# Patient Record
Sex: FEMALE | Race: WHITE | NOT HISPANIC OR LATINO | Employment: OTHER | URBAN - METROPOLITAN AREA
[De-identification: names, ages, dates, MRNs, and addresses within clinical notes are randomized per-mention and may not be internally consistent; named-entity substitution may affect disease eponyms.]

---

## 2023-10-17 ENCOUNTER — TELEPHONE (OUTPATIENT)
Dept: PAIN MEDICINE | Facility: CLINIC | Age: 58
End: 2023-10-17
Payer: COMMERCIAL

## 2023-10-17 NOTE — TELEPHONE ENCOUNTER
LEFT MESSAGE FOR PATIENT TO GIVE THE OFFICE A CALL BACK TO SCHEDULE NEW PATIENT VISIT WITH DR SARMIENTO.

## 2023-11-07 PROBLEM — D23.9 OTHER BENIGN NEOPLASM OF SKIN, UNSPECIFIED: Status: ACTIVE | Noted: 2022-11-22

## 2023-11-07 PROBLEM — E04.2 MULTIPLE THYROID NODULES: Status: ACTIVE | Noted: 2023-11-07

## 2023-11-07 PROBLEM — H90.3 ASYMMETRIC SNHL (SENSORINEURAL HEARING LOSS): Status: ACTIVE | Noted: 2023-11-07

## 2023-11-07 PROBLEM — E04.1 LEFT THYROID NODULE: Status: ACTIVE | Noted: 2023-11-07

## 2023-11-07 PROBLEM — D48.5 NEOPLASM OF UNCERTAIN BEHAVIOR OF SKIN: Status: ACTIVE | Noted: 2022-11-22

## 2023-11-07 PROBLEM — L81.4 OTHER MELANIN HYPERPIGMENTATION: Status: ACTIVE | Noted: 2022-11-22

## 2023-11-07 PROBLEM — L57.0 ACTINIC KERATOSIS: Status: ACTIVE | Noted: 2022-11-22

## 2023-11-07 PROBLEM — D18.01 HEMANGIOMA OF SKIN AND SUBCUTANEOUS TISSUE: Status: ACTIVE | Noted: 2022-11-22

## 2023-11-07 PROBLEM — R09.A2 GLOBUS SENSATION: Status: ACTIVE | Noted: 2023-11-07

## 2023-11-07 PROBLEM — H81.12 BENIGN PAROXYSMAL POSITIONAL VERTIGO OF LEFT EAR: Status: ACTIVE | Noted: 2023-11-07

## 2023-11-07 PROBLEM — L70.0 ACNE VULGARIS: Status: ACTIVE | Noted: 2022-11-22

## 2023-11-07 PROBLEM — G60.9 IDIOPATHIC PERIPHERAL NEUROPATHY: Status: ACTIVE | Noted: 2023-11-07

## 2023-11-07 PROBLEM — D22.70 MELANOCYTIC NEVI OF UNSPECIFIED LOWER LIMB, INCLUDING HIP: Status: ACTIVE | Noted: 2022-11-22

## 2023-11-07 PROBLEM — L90.5 SCAR CONDITION AND FIBROSIS OF SKIN: Status: ACTIVE | Noted: 2022-11-22

## 2023-11-07 PROBLEM — Z85.820 HISTORY OF MELANOMA: Status: ACTIVE | Noted: 2022-11-22

## 2023-11-07 PROBLEM — D22.60 MELANOCYTIC NEVI OF UNSPECIFIED UPPER LIMB, INCLUDING SHOULDER: Status: ACTIVE | Noted: 2022-11-22

## 2023-11-07 PROBLEM — D22.4 MELANOCYTIC NEVI OF SCALP AND NECK: Status: ACTIVE | Noted: 2022-11-22

## 2023-11-07 PROBLEM — H91.90 HEARING LOSS: Status: ACTIVE | Noted: 2023-11-07

## 2023-11-07 PROBLEM — L71.9 ROSACEA, UNSPECIFIED: Status: ACTIVE | Noted: 2022-11-22

## 2023-11-07 PROBLEM — D22.30 MELANOCYTIC NEVI OF UNSPECIFIED PART OF FACE: Status: ACTIVE | Noted: 2022-11-22

## 2023-11-07 PROBLEM — I10 BENIGN ESSENTIAL HYPERTENSION: Status: ACTIVE | Noted: 2023-11-07

## 2023-11-07 PROBLEM — G47.33 OBSTRUCTIVE SLEEP APNEA SYNDROME: Status: ACTIVE | Noted: 2023-11-07

## 2023-11-07 PROBLEM — D22.5 MELANOCYTIC NEVI OF TRUNK: Status: ACTIVE | Noted: 2022-11-22

## 2023-11-07 PROBLEM — E04.9 NONTOXIC GOITER: Status: ACTIVE | Noted: 2023-11-07

## 2023-11-07 RX ORDER — TRETINOIN 0.8 MG/G
GEL TOPICAL
COMMUNITY
Start: 2015-10-09 | End: 2024-03-27 | Stop reason: WASHOUT

## 2023-11-07 RX ORDER — POTASSIUM CHLORIDE 1500 MG/1
20 TABLET, EXTENDED RELEASE ORAL
COMMUNITY
Start: 2023-08-02 | End: 2024-01-30

## 2023-11-07 RX ORDER — DOXYCYCLINE 40 MG/1
40 CAPSULE ORAL
COMMUNITY
Start: 2016-04-18 | End: 2024-03-27 | Stop reason: WASHOUT

## 2023-11-07 RX ORDER — TRETINOIN 0.5 MG/G
CREAM TOPICAL
COMMUNITY
Start: 2022-04-20

## 2023-11-07 RX ORDER — GABAPENTIN 100 MG/1
100 CAPSULE ORAL DAILY
COMMUNITY
Start: 2023-04-18 | End: 2024-03-27 | Stop reason: WASHOUT

## 2023-11-07 RX ORDER — MUPIROCIN 20 MG/G
OINTMENT TOPICAL
COMMUNITY
Start: 2023-10-13 | End: 2024-03-27 | Stop reason: WASHOUT

## 2023-11-07 RX ORDER — HYALURONATE SODIUM 0.2 %
CREAM (GRAM) TOPICAL
COMMUNITY
Start: 2015-03-13 | End: 2024-03-27 | Stop reason: WASHOUT

## 2023-11-07 RX ORDER — HYDROCHLOROTHIAZIDE 50 MG/1
50 TABLET ORAL DAILY
COMMUNITY
Start: 2018-01-08 | End: 2023-12-25

## 2023-11-07 RX ORDER — METRONIDAZOLE 7.5 MG/G
CREAM TOPICAL
COMMUNITY
Start: 2015-03-13 | End: 2024-03-27 | Stop reason: WASHOUT

## 2023-11-07 RX ORDER — TRETINOIN 0.25 MG/G
CREAM TOPICAL
COMMUNITY
Start: 2023-10-13 | End: 2024-03-27 | Stop reason: WASHOUT

## 2023-11-07 RX ORDER — IVERMECTIN 10 MG/G
CREAM TOPICAL
COMMUNITY
Start: 2016-04-18

## 2023-11-07 RX ORDER — MELOXICAM 15 MG/1
15 TABLET ORAL DAILY
COMMUNITY
Start: 2023-08-04 | End: 2024-03-27 | Stop reason: WASHOUT

## 2023-11-08 ENCOUNTER — OFFICE VISIT (OUTPATIENT)
Dept: PAIN MEDICINE | Facility: CLINIC | Age: 58
End: 2023-11-08
Payer: COMMERCIAL

## 2023-11-08 VITALS — SYSTOLIC BLOOD PRESSURE: 152 MMHG | RESPIRATION RATE: 22 BRPM | HEART RATE: 79 BPM | DIASTOLIC BLOOD PRESSURE: 88 MMHG

## 2023-11-08 DIAGNOSIS — G56.82 NEUROPATHY OF LEFT SUPRASCAPULAR NERVE: Primary | ICD-10-CM

## 2023-11-08 PROCEDURE — 99214 OFFICE O/P EST MOD 30 MIN: CPT | Performed by: ANESTHESIOLOGY

## 2023-11-08 PROCEDURE — 1036F TOBACCO NON-USER: CPT | Performed by: ANESTHESIOLOGY

## 2023-11-08 PROCEDURE — 99204 OFFICE O/P NEW MOD 45 MIN: CPT | Performed by: ANESTHESIOLOGY

## 2023-11-08 PROCEDURE — 3077F SYST BP >= 140 MM HG: CPT | Performed by: ANESTHESIOLOGY

## 2023-11-08 PROCEDURE — 3079F DIAST BP 80-89 MM HG: CPT | Performed by: ANESTHESIOLOGY

## 2023-11-08 RX ORDER — PREGABALIN 75 MG/1
75 CAPSULE ORAL 2 TIMES DAILY
Qty: 60 CAPSULE | Refills: 1 | Status: SHIPPED | OUTPATIENT
Start: 2023-11-08 | End: 2024-03-27 | Stop reason: WASHOUT

## 2023-11-08 ASSESSMENT — ENCOUNTER SYMPTOMS
ARTHRALGIAS: 1
ACTIVITY CHANGE: 1

## 2023-11-08 ASSESSMENT — PATIENT HEALTH QUESTIONNAIRE - PHQ9
SUM OF ALL RESPONSES TO PHQ9 QUESTIONS 1 & 2: 0
1. LITTLE INTEREST OR PLEASURE IN DOING THINGS: NOT AT ALL
2. FEELING DOWN, DEPRESSED OR HOPELESS: NOT AT ALL

## 2023-11-08 ASSESSMENT — LIFESTYLE VARIABLES
SKIP TO QUESTIONS 9-10: 1
HOW MANY STANDARD DRINKS CONTAINING ALCOHOL DO YOU HAVE ON A TYPICAL DAY: 1 OR 2
HOW OFTEN DO YOU HAVE SIX OR MORE DRINKS ON ONE OCCASION: NEVER
HOW OFTEN DO YOU HAVE A DRINK CONTAINING ALCOHOL: MONTHLY OR LESS
AUDIT-C TOTAL SCORE: 1
TOTAL SCORE: 0

## 2023-11-08 ASSESSMENT — PAIN DESCRIPTION - DESCRIPTORS: DESCRIPTORS: TIGHTNESS

## 2023-11-08 ASSESSMENT — PAIN - FUNCTIONAL ASSESSMENT: PAIN_FUNCTIONAL_ASSESSMENT: 0-10

## 2023-11-08 ASSESSMENT — PAIN SCALES - GENERAL: PAINLEVEL_OUTOF10: 8

## 2023-11-08 NOTE — PROGRESS NOTES
Patient has left shoulder and neck pain.  In 2018 she grabbed the collar of a dog, and it jerked her forward. 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

## 2023-11-08 NOTE — PROGRESS NOTES
The patient is a 57-year-old female with left shoulder pain.  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  5 years ago she developed an acute onset of shoulder pain that started after she was walking her dog when her dog pulled forward suddenly.  This shoulder pain is intermittent.  It happens on some days depending upon her level of activity.  It resolves by the time she wakes up the next morning.  The pain seems to start in the pectoral/clavicular area and wraps over her shoulder to the scapula.  When the pain is present, it is quite severe.  The pain is affecting her quality of life to a significant degree.  The patient has undergone a variety of treatments including subacromial shoulder injections, anterior shoulder injections as well as cervical epidural steroid injections.  None provided appreciable relief.  She tried a scant amount of gabapentin a few times without benefit.  She has participated in physical therapy.  She has modified her lifestyle because of the pain.    Review of Systems   Constitutional:  Positive for activity change.   Musculoskeletal:  Positive for arthralgias.   All other systems reviewed and are negative.    GENERAL: alert and appropriate, in no distress, well-hydrated, well nourished, interactive         SKIN: no rash noted         HEAD: normocephalic, no abnormality or lesion noted         EYES: no injection and visual acuity is grossly normal         EARS: external ears normal, no mastoid tenderness         NOSE: external nose normal without rhinorrhea         OROPHARYNX: moist mucus membranes, no tonsillar hypertrophy/exudate, uvula midline and pharynx non-erythematous, lips, teeth and gums are without obvious lesion         NECK: Adequate ROM, no cervical LNs noted         RESPIRATORY: breathing non-labored and no grunting/flaring/retractions         CHEST: equal chest rise with normal respiratory effort         ABDOMEN: soft  and non-tender         BACK: back normal in appearance, cervical and lumbar spine with adequate ROM         EXTREMITIES: strength intact, left shoulder range of motion is reduced and reproduced her neuropathic pain         NEUROLOGIC: gait antalgic, SLR positive, Nat sign negative, Spurling sign did not reproduce pain, sensation grossly intact    Assessment and Plan    -Chronicity--chronic neuropathic pain    -Diagnostics--no new imaging ordered    -Pharmacologic--the patient may benefit from an adequate dose of pregabalin taken regularly.  We discussed the mechanism of action of this medication as well as the side effect profile.    -Psychologic--no need for psychologic intervention from my standpoint    -Physical--we discussed the importance of physical therapy and exercise.  We discussed avoidance and modification techniques.    -Intervention--if the pregabalin is ineffective, we will consider temporary peripheral nerve stimulation of the suprascapular nerve with a Sprint device.    I spent time educating the patient on the condition including the treatment and the prognosis.  I invited the patient to call at anytime with any questions.

## 2023-11-29 ENCOUNTER — APPOINTMENT (OUTPATIENT)
Dept: RADIOLOGY | Facility: CLINIC | Age: 58
End: 2023-11-29
Payer: COMMERCIAL

## 2023-11-29 ENCOUNTER — ANCILLARY PROCEDURE (OUTPATIENT)
Dept: RADIOLOGY | Facility: CLINIC | Age: 58
End: 2023-11-29
Payer: COMMERCIAL

## 2023-11-29 VITALS — WEIGHT: 225 LBS | HEIGHT: 70 IN | BODY MASS INDEX: 32.21 KG/M2

## 2023-11-29 DIAGNOSIS — Z12.31 SCREENING MAMMOGRAM, ENCOUNTER FOR: ICD-10-CM

## 2023-11-29 PROCEDURE — 77063 BREAST TOMOSYNTHESIS BI: CPT

## 2024-01-10 ENCOUNTER — APPOINTMENT (OUTPATIENT)
Dept: PAIN MEDICINE | Facility: CLINIC | Age: 59
End: 2024-01-10
Payer: COMMERCIAL

## 2024-01-22 ENCOUNTER — APPOINTMENT (OUTPATIENT)
Dept: PAIN MEDICINE | Facility: CLINIC | Age: 59
End: 2024-01-22
Payer: COMMERCIAL

## 2024-01-29 ENCOUNTER — APPOINTMENT (OUTPATIENT)
Dept: PAIN MEDICINE | Facility: CLINIC | Age: 59
End: 2024-01-29
Payer: COMMERCIAL

## 2024-01-29 DIAGNOSIS — I10 BENIGN ESSENTIAL HYPERTENSION: Primary | ICD-10-CM

## 2024-01-30 RX ORDER — POTASSIUM CHLORIDE 1500 MG/1
20 TABLET, EXTENDED RELEASE ORAL DAILY
Qty: 30 TABLET | Refills: 0 | Status: SHIPPED | OUTPATIENT
Start: 2024-01-30 | End: 2024-03-27 | Stop reason: SDUPTHER

## 2024-01-31 ENCOUNTER — APPOINTMENT (OUTPATIENT)
Dept: PRIMARY CARE | Facility: CLINIC | Age: 59
End: 2024-01-31
Payer: COMMERCIAL

## 2024-03-27 ENCOUNTER — OFFICE VISIT (OUTPATIENT)
Dept: PRIMARY CARE | Facility: CLINIC | Age: 59
End: 2024-03-27
Payer: COMMERCIAL

## 2024-03-27 VITALS
WEIGHT: 222.4 LBS | HEART RATE: 70 BPM | OXYGEN SATURATION: 96 % | DIASTOLIC BLOOD PRESSURE: 66 MMHG | SYSTOLIC BLOOD PRESSURE: 118 MMHG | HEIGHT: 70 IN | BODY MASS INDEX: 31.84 KG/M2 | RESPIRATION RATE: 16 BRPM

## 2024-03-27 DIAGNOSIS — E66.1 CLASS 1 DRUG-INDUCED OBESITY WITH SERIOUS COMORBIDITY AND BODY MASS INDEX (BMI) OF 31.0 TO 31.9 IN ADULT: ICD-10-CM

## 2024-03-27 DIAGNOSIS — I10 BENIGN ESSENTIAL HYPERTENSION: ICD-10-CM

## 2024-03-27 DIAGNOSIS — Z00.00 ANNUAL PHYSICAL EXAM: Primary | ICD-10-CM

## 2024-03-27 PROCEDURE — 99213 OFFICE O/P EST LOW 20 MIN: CPT | Performed by: STUDENT IN AN ORGANIZED HEALTH CARE EDUCATION/TRAINING PROGRAM

## 2024-03-27 PROCEDURE — 3078F DIAST BP <80 MM HG: CPT | Performed by: STUDENT IN AN ORGANIZED HEALTH CARE EDUCATION/TRAINING PROGRAM

## 2024-03-27 PROCEDURE — 99396 PREV VISIT EST AGE 40-64: CPT | Performed by: STUDENT IN AN ORGANIZED HEALTH CARE EDUCATION/TRAINING PROGRAM

## 2024-03-27 PROCEDURE — 3074F SYST BP LT 130 MM HG: CPT | Performed by: STUDENT IN AN ORGANIZED HEALTH CARE EDUCATION/TRAINING PROGRAM

## 2024-03-27 PROCEDURE — 3008F BODY MASS INDEX DOCD: CPT | Performed by: STUDENT IN AN ORGANIZED HEALTH CARE EDUCATION/TRAINING PROGRAM

## 2024-03-27 RX ORDER — OMEGA-3-ACID ETHYL ESTERS 1 G/1
2 CAPSULE, LIQUID FILLED ORAL DAILY
COMMUNITY

## 2024-03-27 RX ORDER — POTASSIUM CHLORIDE 20 MEQ/1
20 TABLET, EXTENDED RELEASE ORAL DAILY
Qty: 90 TABLET | Refills: 1 | Status: SHIPPED | OUTPATIENT
Start: 2024-03-27

## 2024-03-27 RX ORDER — HYDROCHLOROTHIAZIDE 50 MG/1
50 TABLET ORAL DAILY
Qty: 90 TABLET | Refills: 1 | Status: SHIPPED | OUTPATIENT
Start: 2024-03-27

## 2024-03-27 ASSESSMENT — PATIENT HEALTH QUESTIONNAIRE - PHQ9
2. FEELING DOWN, DEPRESSED OR HOPELESS: NOT AT ALL
1. LITTLE INTEREST OR PLEASURE IN DOING THINGS: NOT AT ALL
SUM OF ALL RESPONSES TO PHQ9 QUESTIONS 1 AND 2: 0

## 2024-03-27 ASSESSMENT — ENCOUNTER SYMPTOMS
DIARRHEA: 0
SINUS PAIN: 0
SINUS PRESSURE: 0
DYSURIA: 0
PALPITATIONS: 0
ARTHRALGIAS: 0
ABDOMINAL PAIN: 0
CONSTIPATION: 0
ADENOPATHY: 0
RHINORRHEA: 0

## 2024-03-27 NOTE — PROGRESS NOTES
History Of Present Illness  Nette Lopez is a 58 y.o. female presents for cpe.     Hair loss along her part   Left shoulder   6 years   Dr. Bhat s/p steroid injections  MRI   Dr. Walt Capellan with no improvement. She has tried flexeril and lyrica per pain management with Dr. Marin         Past Medical History  She has a past medical history of Deficiency of vitamin K (11/16/2018), Encounter for screening for malignant neoplasm of colon (09/06/2018), Enteroviral vesicular stomatitis with exanthem (09/18/2019), Other specified health status (03/11/2016), Pain in left shoulder (01/16/2019), Pain in right finger(s) (03/11/2016), Pain in right shoulder, Pain in unspecified finger(s) (03/14/2016), Personal history of malignant melanoma of skin, Personal history of malignant melanoma of skin, Personal history of other diseases of the circulatory system (01/08/2018), Personal history of other diseases of the musculoskeletal system and connective tissue (12/03/2018), Personal history of other diseases of the musculoskeletal system and connective tissue (09/06/2018), Personal history of other diseases of the nervous system and sense organs, Personal history of other specified conditions (10/04/2019), Personal history of other specified conditions (10/16/2019), and Unspecified disorder of synovium and tendon, unspecified shoulder (03/11/2016).    Surgical History  She has a past surgical history that includes Other surgical history (07/12/2013).     Social History  She reports that she has never smoked. She has never used smokeless tobacco. She reports current alcohol use. No history on file for drug use.    Family History  Family History   Problem Relation Name Age of Onset    Arthritis Mother      Hypertension Mother      Hypothyroidism Mother      Heart disease Father      Arthritis Father      No Known Problems Brother      No Known Problems Daughter      No Known Problems Son      Colon cancer Maternal  Grandmother      Cancer Maternal Grandmother      Hypertension Maternal Grandmother      Hypertension Maternal Grandfather      Cancer Paternal Grandmother      Melanoma Paternal Grandmother          Allergies  Diclofenac-silicone, adhesive    Review of Systems   HENT:  Negative for rhinorrhea, sinus pressure and sinus pain.    Cardiovascular:  Negative for chest pain and palpitations.   Gastrointestinal:  Negative for abdominal pain, constipation and diarrhea.   Genitourinary:  Negative for dysuria.   Musculoskeletal:  Negative for arthralgias and gait problem.   Hematological:  Negative for adenopathy.        Physical Exam  Vitals reviewed.   Constitutional:       General: She is not in acute distress.     Appearance: She is not ill-appearing.   HENT:      Right Ear: Tympanic membrane and ear canal normal.      Left Ear: Tympanic membrane and ear canal normal.      Mouth/Throat:      Mouth: Mucous membranes are moist.      Pharynx: Oropharynx is clear. No oropharyngeal exudate or posterior oropharyngeal erythema.   Eyes:      Extraocular Movements: Extraocular movements intact.      Conjunctiva/sclera: Conjunctivae normal.      Pupils: Pupils are equal, round, and reactive to light.   Neck:      Vascular: No carotid bruit.   Cardiovascular:      Rate and Rhythm: Normal rate and regular rhythm.      Heart sounds: No murmur heard.     No gallop.   Pulmonary:      Effort: Pulmonary effort is normal.      Breath sounds: Normal breath sounds. No wheezing, rhonchi or rales.   Abdominal:      General: Abdomen is flat. Bowel sounds are normal.      Palpations: Abdomen is soft.      Tenderness: There is no abdominal tenderness.   Musculoskeletal:      Cervical back: Neck supple.      Left lower leg: No edema.   Skin:     General: Skin is warm and dry.      Findings: No rash.   Neurological:      General: No focal deficit present.      Mental Status: She is alert and oriented to person, place, and time.      Gait: Gait  normal.   Psychiatric:         Mood and Affect: Mood normal.         Behavior: Behavior normal.        Last Recorded Vitals  /66   Pulse 70   Resp 16   Wt 101 kg (222 lb 6.4 oz)   SpO2 96%     Relevant Results    Current Outpatient Medications:     hydroCHLOROthiazide (HYDRODiuril) 50 mg tablet, TAKE 1 TABLET DAILY AS DIRECTED, Disp: 90 tablet, Rfl: 1    potassium chloride CR (Klor-Con M20) 20 mEq ER tablet, TAKE 1 TABLET DAILY, Disp: 30 tablet, Rfl: 0    Soolantra 1 % cream, 1 Application, Disp: , Rfl:     tretinoin (Retin-A) 0.05 % cream, 1 Application, Disp: , Rfl:        Assessment/Plan   Health Maintenance  - CBC, CMP, Lipid  - up to date with immunizations  - Pap UTD 2020 due 2025  - Mammogram utd  - screening colonoscopy up to date 2020 with Dr. Cain   - eat a diet high in lean protein, vegetable, fruits, and low in carbohydrates  - exercise 20-30 minutes 4-5 days a week     HTN  - in office /78  - continue HCTZ  - continue K+  - follow up in 6 months     Health Maintenance   Topic Date Due    Yearly Adult Physical  Never done    Hepatitis B Vaccines (1 of 3 - 3-dose series) Never done    HIV Screening  Never done    Derm Melanoma Skin Check  Never done    MMR Vaccines (1 of 1 - Standard series) Never done    Hepatitis C Screening  Never done    Cervical Cancer Screening  08/07/2023    Influenza Vaccine (1) 09/01/2023    COVID-19 Vaccine (4 - 2023-24 season) 09/01/2023    DTaP/Tdap/Td Vaccines (2 - Td or Tdap) 11/22/2023    Mammogram  11/29/2024    Lipid Panel  08/12/2026    Colorectal Cancer Screening  10/19/2030    Zoster Vaccines  Completed    HIB Vaccines  Aged Out    IPV Vaccines  Aged Out    Hepatitis A Vaccines  Aged Out    Meningococcal Vaccine  Aged Out    Rotavirus Vaccines  Aged Out    HPV Vaccines  Aged Out    Pneumococcal Vaccine: Pediatrics (0 to 5 Years) and At-Risk Patients (6 to 64 Years)  Aged Out    Irritable Bowel Syndrome  Discontinued           Adeola Cordoba,

## 2024-07-24 ENCOUNTER — LAB (OUTPATIENT)
Dept: LAB | Facility: LAB | Age: 59
End: 2024-07-24
Payer: COMMERCIAL

## 2024-07-24 DIAGNOSIS — E66.1 CLASS 1 DRUG-INDUCED OBESITY WITH SERIOUS COMORBIDITY AND BODY MASS INDEX (BMI) OF 31.0 TO 31.9 IN ADULT: ICD-10-CM

## 2024-07-24 DIAGNOSIS — I10 BENIGN ESSENTIAL HYPERTENSION: ICD-10-CM

## 2024-07-24 LAB
ALBUMIN SERPL BCP-MCNC: 4.7 G/DL (ref 3.4–5)
ALP SERPL-CCNC: 55 U/L (ref 33–110)
ALT SERPL W P-5'-P-CCNC: 13 U/L (ref 7–45)
ANION GAP SERPL CALC-SCNC: 11 MMOL/L (ref 10–20)
AST SERPL W P-5'-P-CCNC: 12 U/L (ref 9–39)
BASOPHILS # BLD AUTO: 0.02 X10*3/UL (ref 0–0.1)
BASOPHILS NFR BLD AUTO: 0.3 %
BILIRUB SERPL-MCNC: 0.9 MG/DL (ref 0–1.2)
BUN SERPL-MCNC: 18 MG/DL (ref 6–23)
CALCIUM SERPL-MCNC: 10 MG/DL (ref 8.6–10.3)
CHLORIDE SERPL-SCNC: 100 MMOL/L (ref 98–107)
CHOLEST SERPL-MCNC: 203 MG/DL (ref 0–199)
CHOLESTEROL/HDL RATIO: 4
CO2 SERPL-SCNC: 33 MMOL/L (ref 21–32)
CREAT SERPL-MCNC: 0.8 MG/DL (ref 0.5–1.05)
CREAT UR-MCNC: 137.5 MG/DL (ref 20–320)
EGFRCR SERPLBLD CKD-EPI 2021: 87 ML/MIN/1.73M*2
EOSINOPHIL # BLD AUTO: 0.12 X10*3/UL (ref 0–0.7)
EOSINOPHIL NFR BLD AUTO: 1.8 %
ERYTHROCYTE [DISTWIDTH] IN BLOOD BY AUTOMATED COUNT: 12.5 % (ref 11.5–14.5)
EST. AVERAGE GLUCOSE BLD GHB EST-MCNC: 111 MG/DL
GLUCOSE SERPL-MCNC: 94 MG/DL (ref 74–99)
HBA1C MFR BLD: 5.5 %
HCT VFR BLD AUTO: 42.1 % (ref 36–46)
HDLC SERPL-MCNC: 50.7 MG/DL
HGB BLD-MCNC: 13.8 G/DL (ref 12–16)
IMM GRANULOCYTES # BLD AUTO: 0.01 X10*3/UL (ref 0–0.7)
IMM GRANULOCYTES NFR BLD AUTO: 0.2 % (ref 0–0.9)
LDLC SERPL CALC-MCNC: 123 MG/DL
LYMPHOCYTES # BLD AUTO: 2.18 X10*3/UL (ref 1.2–4.8)
LYMPHOCYTES NFR BLD AUTO: 32.8 %
MCH RBC QN AUTO: 29.5 PG (ref 26–34)
MCHC RBC AUTO-ENTMCNC: 32.8 G/DL (ref 32–36)
MCV RBC AUTO: 90 FL (ref 80–100)
MICROALBUMIN UR-MCNC: 7.1 MG/L
MICROALBUMIN/CREAT UR: 5.2 UG/MG CREAT
MONOCYTES # BLD AUTO: 0.51 X10*3/UL (ref 0.1–1)
MONOCYTES NFR BLD AUTO: 7.7 %
NEUTROPHILS # BLD AUTO: 3.8 X10*3/UL (ref 1.2–7.7)
NEUTROPHILS NFR BLD AUTO: 57.2 %
NON HDL CHOLESTEROL: 152 MG/DL (ref 0–149)
NRBC BLD-RTO: NORMAL /100{WBCS}
PLATELET # BLD AUTO: 261 X10*3/UL (ref 150–450)
POTASSIUM SERPL-SCNC: 3.5 MMOL/L (ref 3.5–5.3)
PROT SERPL-MCNC: 7.4 G/DL (ref 6.4–8.2)
RBC # BLD AUTO: 4.68 X10*6/UL (ref 4–5.2)
SODIUM SERPL-SCNC: 140 MMOL/L (ref 136–145)
TRIGL SERPL-MCNC: 147 MG/DL (ref 0–149)
TSH SERPL-ACNC: 2.6 MIU/L (ref 0.44–3.98)
VLDL: 29 MG/DL (ref 0–40)
WBC # BLD AUTO: 6.6 X10*3/UL (ref 4.4–11.3)

## 2024-07-24 PROCEDURE — 82570 ASSAY OF URINE CREATININE: CPT

## 2024-07-24 PROCEDURE — 82043 UR ALBUMIN QUANTITATIVE: CPT

## 2024-07-24 PROCEDURE — 83036 HEMOGLOBIN GLYCOSYLATED A1C: CPT

## 2024-07-24 PROCEDURE — 36415 COLL VENOUS BLD VENIPUNCTURE: CPT

## 2024-11-11 ENCOUNTER — TELEPHONE (OUTPATIENT)
Dept: OTOLARYNGOLOGY | Facility: CLINIC | Age: 59
End: 2024-11-11
Payer: COMMERCIAL

## 2024-11-11 DIAGNOSIS — E04.1 THYROID NODULE: Primary | ICD-10-CM

## 2024-11-26 DIAGNOSIS — I10 BENIGN ESSENTIAL HYPERTENSION: ICD-10-CM

## 2024-11-26 RX ORDER — HYDROCHLOROTHIAZIDE 50 MG/1
50 TABLET ORAL DAILY
Qty: 90 TABLET | Refills: 3 | Status: SHIPPED | OUTPATIENT
Start: 2024-11-26

## 2024-12-05 ENCOUNTER — TELEPHONE (OUTPATIENT)
Dept: PRIMARY CARE | Facility: CLINIC | Age: 59
End: 2024-12-05
Payer: COMMERCIAL

## 2024-12-05 DIAGNOSIS — Z12.31 SCREENING MAMMOGRAM FOR BREAST CANCER: Primary | ICD-10-CM

## 2025-01-07 ENCOUNTER — HOSPITAL ENCOUNTER (OUTPATIENT)
Dept: RADIOLOGY | Facility: HOSPITAL | Age: 60
Discharge: HOME | End: 2025-01-07
Payer: COMMERCIAL

## 2025-01-07 VITALS — BODY MASS INDEX: 31.78 KG/M2 | WEIGHT: 222 LBS | HEIGHT: 70 IN

## 2025-01-07 DIAGNOSIS — Z12.31 SCREENING MAMMOGRAM FOR BREAST CANCER: ICD-10-CM

## 2025-01-07 PROCEDURE — 77063 BREAST TOMOSYNTHESIS BI: CPT | Performed by: RADIOLOGY

## 2025-01-07 PROCEDURE — 77067 SCR MAMMO BI INCL CAD: CPT | Performed by: RADIOLOGY

## 2025-01-07 PROCEDURE — 77067 SCR MAMMO BI INCL CAD: CPT

## 2025-02-04 ENCOUNTER — HOSPITAL ENCOUNTER (OUTPATIENT)
Dept: RADIOLOGY | Facility: HOSPITAL | Age: 60
Discharge: HOME | End: 2025-02-04
Payer: COMMERCIAL

## 2025-02-04 DIAGNOSIS — E04.1 THYROID NODULE: ICD-10-CM

## 2025-02-04 PROCEDURE — 76536 US EXAM OF HEAD AND NECK: CPT

## 2025-02-04 PROCEDURE — 76536 US EXAM OF HEAD AND NECK: CPT | Performed by: RADIOLOGY

## 2025-02-13 ENCOUNTER — APPOINTMENT (OUTPATIENT)
Dept: OTOLARYNGOLOGY | Facility: CLINIC | Age: 60
End: 2025-02-13
Payer: COMMERCIAL

## 2025-02-13 VITALS — WEIGHT: 228 LBS | BODY MASS INDEX: 32.64 KG/M2 | HEIGHT: 70 IN | TEMPERATURE: 97.3 F

## 2025-02-13 DIAGNOSIS — E04.1 LEFT THYROID NODULE: Primary | ICD-10-CM

## 2025-02-13 DIAGNOSIS — H90.3 BILATERAL SENSORINEURAL HEARING LOSS: ICD-10-CM

## 2025-02-13 PROCEDURE — 99214 OFFICE O/P EST MOD 30 MIN: CPT | Performed by: OTOLARYNGOLOGY

## 2025-02-13 PROCEDURE — 3008F BODY MASS INDEX DOCD: CPT | Performed by: OTOLARYNGOLOGY

## 2025-02-13 PROCEDURE — 1036F TOBACCO NON-USER: CPT | Performed by: OTOLARYNGOLOGY

## 2025-02-13 NOTE — PROGRESS NOTES
Chief Complaint   Patient presents with    Follow-up     LOV 9/22 F/U US  THYROID 2/4/25 IN CHART     HPI:  Nette Lopez is a 59 y.o. female who is today for continued evaluation of her thyroid.  She has a history of right thyroid lobectomy for benign disease in 2009.  She has a history of several nodules on the left-hand side which have been followed. Needle biopsy with uniform follicular cells in 2009.   Recent ultrasound from February 4, 2025 shows no significant change.  One of the nodules did get bigger by a few millimeters but still remains spongiform in nature and low risk.  H/O bilateral sloping high-frequency sensorineural hearing loss which is slightly worse on the left-hand side. 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  ==  US THYROID;  2/4/2025         COMPARISON:  08/12/2022, 03/14/2016      FINDINGS:  RIGHT LOBE:  Status post right thyroidectomy.  There is no mass in the right thyroid bed.      LEFT LOBE:  The left lobe is normal in size measuring 4.3 x 1.9 x 1.9 cm.  There is heterogeneity of the echotexture of the left lobe.  There is a 0.7 cm benign colloid cyst in the left lobe.    NODULE #1:      Location: Superior  Size: 1.3 x 1.1 x 0.6 cm  Composition: Spongiform 0 point  Echogenicity: Hypoechoic 2 points  Shape:  Wider than tall 0 points  Margin:  Smooth 0 points  Echogenic Foci: None 0 points  On 08/12/2022, this measured 1.2 x 1.1 x 0.7 cm  On 03/14/2016, this measured 0.9 x 0.8 x 0.5 cm      If present previously:  Significant change in size (>/= 20% diameter increase in at least  two dimensions and minimal increase of 2mm?): No significant change.  Change in features or ACR TI-RADS category: No change.      The total score of this nodule is 2 points, corresponding to a  TI-RADS category 2-benign     NODULE #2:      Location: Mid  Size: 0.9 x 0.8 x 0.6 cm  Composition: Spongiform 0 point  Echogenicity: Hypoechoic 2 points  Shape:   Wider than tall 0 point  Margin:  Smooth 0 points  Echogenic Foci: None 0 points  On 08/12/2021, this measured 1.0 x 0.6 x 0.7 cm  On 03/14/2016, this was more cystic measuring 1.0 x 0.8 x 0.5 cm      If present previously:  Significant change in size (>/= 20% diameter increase in at least  two dimensions and minimal increase of 2mm?): No significant change.  Change in features or ACR TI-RADS category: No change.      The total score of this nodule is 2 points, corresponding to a  TI-RADS category 2-benign      NODULE #3:      Location: Inferior  Size: 2.0 x 1.7 x 1.2 cm  Composition: Spongiform 0 point  Echogenicity: Hypoechoic 2 point  Shape:  Wider than tall 0 points  Margin:  Smooth 0 points  Echogenic Foci: None 0 points  On 08/12/2022, this measured 1.7 x 1.4 x 1.0 cm.  On 03/14/2016, this measured 1.5 x 1.3 x 0.9 cm.      If present previously:  Significant change in size (>/= 20% diameter increase in at least  two dimensions and minimal increase of 2mm?): Yes, increased. Change  in features or ACR TI-RADS category: No change.      The total score of this nodule is 2 points, corresponding to a  TI-RADS category 2-benign      ISTHMUS:  The isthmus is surgically absent.      CERVICAL LYMPH NODES:  There are no enlarged or morphologically abnormal cervical lymph  nodes.        IMPRESSION:  1. Status post right thyroidectomy.  2. Nonenlarged left lobe with heterogeneity of the echotexture and  multiple nodules.  3. Benign thyroid nodules.  .  PMH:  Past Medical History:   Diagnosis Date    Deficiency of vitamin K 11/16/2018    Vitamin K deficiency    Encounter for screening for malignant neoplasm of colon 09/06/2018    Screening for colon cancer    Enteroviral vesicular stomatitis with exanthem 09/18/2019    Hand, foot and mouth disease    Other specified health status 03/11/2016    Contraception    Pain in left shoulder 01/16/2019    Left anterior shoulder pain    Pain in right finger(s) 03/11/2016    Pain of  right thumb    Pain in right shoulder     Pain of right shoulder region    Pain in unspecified finger(s) 03/14/2016    Thumb pain    Personal history of malignant melanoma of skin     History of malignant melanoma    Personal history of malignant melanoma of skin     History of malignant melanoma of skin    Personal history of other diseases of the circulatory system 01/08/2018    History of hypertension    Personal history of other diseases of the musculoskeletal system and connective tissue 12/03/2018    History of muscle spasm    Personal history of other diseases of the musculoskeletal system and connective tissue 09/06/2018    History of neck pain    Personal history of other diseases of the nervous system and sense organs     History of sleep apnea    Personal history of other specified conditions 10/04/2019    History of wheezing    Personal history of other specified conditions 10/16/2019    History of persistent cough    Unspecified disorder of synovium and tendon, unspecified shoulder 03/11/2016    Rotator cuff dysfunction     Past Surgical History:   Procedure Laterality Date    OTHER SURGICAL HISTORY  07/12/2013    Thyroid Surgery Thyroid Lobectomy Right Lobe         Medications:     Current Outpatient Medications:     hydroCHLOROthiazide (HYDRODiuril) 50 mg tablet, TAKE 1 TABLET DAILY AS DIRECTED, Disp: 90 tablet, Rfl: 3    omega-3 acid ethyl esters (Lovaza) 1 gram capsule, Take 2 capsules (2 g) by mouth once daily., Disp: , Rfl:     potassium chloride CR (Klor-Con M20) 20 mEq ER tablet, Take 1 tablet (20 mEq) by mouth once daily., Disp: 90 tablet, Rfl: 1    Soolantra 1 % cream, 1 Application (Patient not taking: Reported on 2/13/2025), Disp: , Rfl:      Allergies:  Allergies   Allergen Reactions    Diclofenac-Silicone, Adhesive Dermatitis and Rash        ROS:  Review of systems normal unless stated otherwise in the HPI and/or PMH.    Physical Exam:  Temperature 36.3 °C (97.3 °F), height 1.778 m (5'  "10\"), weight 103 kg (228 lb). Body mass index is 32.71 kg/m².     GENERAL APPEARANCE: Well developed and well nourished.  Alert and oriented in no acute distress.  Normal vocal quality.      HEAD/FACE: No erythema or edema or facial tenderness.  Normal facial nerve function bilaterally.    EAR:       EXTERNAL: Normal pinnas and external auditory canals without lesion or obstructing wax.       MIDDLE EAR: Tympanic membranes intact and mobile with normal landmarks.  Middle ear space appears well aerated.       TUBE STATUS: N/A       MASTOID CAVITY: N/A       HEARING: Gross hearing assessment is within normal limits.      NOSE:       VISUALIZED USING: Anterior rhinoscopy with headlight and nasal speculum.       DORSUM: Midline, nontraumatic appearance.       MUCOSA: Normal-appearing.       SECRETIONS: Normal.       SEPTUM: Midline and nonobstructing.       INFERIOR TURBINATES: Normal.       MIDDLE TURBINATES/MEATUS: N/A       BLEEDING: N/A         ORAL CAVITY/PHARYNX:       TEETH: Adequate dentition.       TONGUE: No mass or lesion.  Normal mobility.       FLOOR OF MOUTH: No mass or lesion.       PALATE: Normal hard palate, soft palate, and uvula.       OROPHARYNX: Normal without mass or lesion.       BUCCAL MUCOSA/GBS: Normal without mass or lesion.       LIPS: Normal.    LARYNX/HYPOPHARYNX/NASOPHARYNX: N/A    NECK: No palpable masses or abnormal adenopathy.  Trachea is midline.    THYROID: No thyromegaly or palpable nodule.  Mild subtle fullness on the left.  Previous incision well-healed.    SALIVARY GLANDS: Normal bilateral parotid and submandibular glands by inspection and palpation.    TMJ's: Normal.    NEURO: Cranial nerve exam grossly normal bilaterally.             Assessment/Plan   Nette was seen today for follow-up.  Diagnoses and all orders for this visit:  Left thyroid nodule (Primary)  Bilateral sensorineural hearing loss     Relatively stable left-sided thyroid nodules which are spongiform and low " risk in nature.  Benign biopsy over 15 years ago.  Recheck ultrasound and exam in 2 years.  Unfortunately her mother recently passed away but she did have relatively new hearing aids.  She is looking up possibly seeing if they could be fitted for her.  Will have her follow-up with an audiogram and hearing aid evaluation at the same time.  Follow up in about 2 years (around 2/13/2027).     Wilmer Samson MD

## 2025-03-24 DIAGNOSIS — I10 BENIGN ESSENTIAL HYPERTENSION: ICD-10-CM

## 2025-03-24 RX ORDER — POTASSIUM CHLORIDE 1500 MG/1
20 TABLET, EXTENDED RELEASE ORAL DAILY
Qty: 90 TABLET | Refills: 3 | OUTPATIENT
Start: 2025-03-24

## 2025-03-31 ENCOUNTER — TELEPHONE (OUTPATIENT)
Dept: PRIMARY CARE | Facility: CLINIC | Age: 60
End: 2025-03-31
Payer: COMMERCIAL

## 2025-04-01 DIAGNOSIS — I10 BENIGN ESSENTIAL HYPERTENSION: ICD-10-CM

## 2025-04-01 RX ORDER — POTASSIUM CHLORIDE 20 MEQ/1
20 TABLET, EXTENDED RELEASE ORAL DAILY
Qty: 90 TABLET | Refills: 0 | Status: SHIPPED | OUTPATIENT
Start: 2025-04-01

## 2025-04-02 ENCOUNTER — APPOINTMENT (OUTPATIENT)
Dept: PRIMARY CARE | Facility: CLINIC | Age: 60
End: 2025-04-02
Payer: COMMERCIAL

## 2025-04-10 ENCOUNTER — APPOINTMENT (OUTPATIENT)
Dept: AUDIOLOGY | Facility: CLINIC | Age: 60
End: 2025-04-10
Payer: COMMERCIAL

## 2025-04-17 ENCOUNTER — APPOINTMENT (OUTPATIENT)
Dept: PRIMARY CARE | Facility: CLINIC | Age: 60
End: 2025-04-17
Payer: COMMERCIAL

## 2025-06-03 ENCOUNTER — APPOINTMENT (OUTPATIENT)
Dept: PRIMARY CARE | Facility: CLINIC | Age: 60
End: 2025-06-03
Payer: COMMERCIAL

## 2025-06-05 ENCOUNTER — APPOINTMENT (OUTPATIENT)
Dept: PRIMARY CARE | Facility: CLINIC | Age: 60
End: 2025-06-05
Payer: COMMERCIAL

## 2025-06-05 VITALS
OXYGEN SATURATION: 97 % | HEART RATE: 80 BPM | HEIGHT: 70 IN | SYSTOLIC BLOOD PRESSURE: 114 MMHG | BODY MASS INDEX: 32.47 KG/M2 | DIASTOLIC BLOOD PRESSURE: 68 MMHG | WEIGHT: 226.8 LBS

## 2025-06-05 DIAGNOSIS — Z12.4 CERVICAL CANCER SCREENING: ICD-10-CM

## 2025-06-05 DIAGNOSIS — I10 BENIGN ESSENTIAL HYPERTENSION: ICD-10-CM

## 2025-06-05 DIAGNOSIS — M25.512 CHRONIC LEFT SHOULDER PAIN: Primary | ICD-10-CM

## 2025-06-05 DIAGNOSIS — G89.29 CHRONIC LEFT SHOULDER PAIN: Primary | ICD-10-CM

## 2025-06-05 PROCEDURE — 3008F BODY MASS INDEX DOCD: CPT | Performed by: STUDENT IN AN ORGANIZED HEALTH CARE EDUCATION/TRAINING PROGRAM

## 2025-06-05 PROCEDURE — 99214 OFFICE O/P EST MOD 30 MIN: CPT | Performed by: STUDENT IN AN ORGANIZED HEALTH CARE EDUCATION/TRAINING PROGRAM

## 2025-06-05 PROCEDURE — 3074F SYST BP LT 130 MM HG: CPT | Performed by: STUDENT IN AN ORGANIZED HEALTH CARE EDUCATION/TRAINING PROGRAM

## 2025-06-05 PROCEDURE — 3078F DIAST BP <80 MM HG: CPT | Performed by: STUDENT IN AN ORGANIZED HEALTH CARE EDUCATION/TRAINING PROGRAM

## 2025-06-05 RX ORDER — POTASSIUM CHLORIDE 20 MEQ/1
20 TABLET, EXTENDED RELEASE ORAL DAILY
Qty: 90 TABLET | Refills: 1 | Status: SHIPPED | OUTPATIENT
Start: 2025-06-05

## 2025-06-05 RX ORDER — HYDROCHLOROTHIAZIDE 50 MG/1
50 TABLET ORAL DAILY
Qty: 90 TABLET | Refills: 1 | Status: SHIPPED | OUTPATIENT
Start: 2025-06-05

## 2025-06-05 ASSESSMENT — PATIENT HEALTH QUESTIONNAIRE - PHQ9
2. FEELING DOWN, DEPRESSED OR HOPELESS: NOT AT ALL
SUM OF ALL RESPONSES TO PHQ9 QUESTIONS 1 AND 2: 0
1. LITTLE INTEREST OR PLEASURE IN DOING THINGS: NOT AT ALL

## 2025-06-05 NOTE — PROGRESS NOTES
"  Subjective   Patient ID:   Nette Lopez is a  59 y.o. female who presents for Med Refill (Pt is here for a med check and refills.).     HPI   Interval Hx:  Following with pain management   former dental hygienist   Initial injury: States that her right shoulder pain began around 6 years ago when she was pulls a pitbull dog away from her dog and the pitbull jerked her arm forward.   Left shoulder pain- obtaining steroid injections and arthrocentesis  EMG  MRI  Previous medications: Flexeril, Tylenol, prednisone, methylprednisone meloxicam   Completed physical therapy    Thyroid nodule   Status post right thyroidectomy  Following with Dr. Samson          Current Medications[1]    Visit Vitals  /68   Pulse 80   Ht 1.778 m (5' 10\")   Wt 103 kg (226 lb 12.8 oz)   SpO2 97%   BMI 32.54 kg/m²   OB Status Postmenopausal   Smoking Status Never   BSA 2.26 m²       Objective   Physical Exam  Constitutional:       Appearance: Normal appearance.   Cardiovascular:      Rate and Rhythm: Normal rate and regular rhythm.      Heart sounds: No murmur heard.  Pulmonary:      Effort: Pulmonary effort is normal. No respiratory distress.      Breath sounds: Normal breath sounds. No wheezing.   Musculoskeletal:      Cervical back: Neck supple.      Left lower leg: No edema.   Neurological:      Mental Status: She is alert.           Assessment/Plan   Diagnoses and all orders for this visit:  Chronic left shoulder pain  -     Referral to Occupational Therapy; Future  Benign essential hypertension  -     hydroCHLOROthiazide (HYDRODiuril) 50 mg tablet; Take 1 tablet (50 mg) by mouth once daily. as directed  -     potassium chloride CR (Klor-Con M20) 20 mEq ER tablet; Take 1 tablet (20 mEq) by mouth once daily. No additional refills prior to appointment.  -     CBC and Auto Differential; Future  -     Comprehensive metabolic panel; Future  -     Albumin-Creatinine Ratio, Urine Random; Future  Cervical cancer screening  -     " Referral to Gynecology; Future       Health Maintenance  - CBC, CMP, Lipid  - up to date with immunizations  - Pap UTD 2020 due 2025  - Mammogram utd  - screening colonoscopy up to date 2020 with Dr. Cain   - eat a diet high in lean protein, vegetable, fruits, and low in carbohydrates  - exercise 20-30 minutes 4-5 days a week     HTN  - in office /68  - continue HCTZ  - continue K+  - follow up in 6 months    Adeola Cordoba DO 03/12/25 2:02 PM        [1]   Current Outpatient Medications:     hydroCHLOROthiazide (HYDRODiuril) 50 mg tablet, TAKE 1 TABLET DAILY AS DIRECTED, Disp: 90 tablet, Rfl: 3    omega-3 fatty acids (FISH OIL CONCENTRATE ORAL), Take by mouth., Disp: , Rfl:     potassium chloride CR (Klor-Con M20) 20 mEq ER tablet, Take 1 tablet (20 mEq) by mouth once daily. No additional refills prior to appointment., Disp: 90 tablet, Rfl: 0    Soolantra 1 % cream, 1 Application, Disp: , Rfl:     omega-3 acid ethyl esters (Lovaza) 1 gram capsule, Take 2 capsules (2 g) by mouth once daily., Disp: , Rfl:

## 2025-06-24 ENCOUNTER — APPOINTMENT (OUTPATIENT)
Dept: AUDIOLOGY | Facility: CLINIC | Age: 60
End: 2025-06-24

## 2025-06-24 ENCOUNTER — APPOINTMENT (OUTPATIENT)
Dept: AUDIOLOGY | Facility: CLINIC | Age: 60
End: 2025-06-24
Payer: COMMERCIAL

## 2025-08-19 ENCOUNTER — APPOINTMENT (OUTPATIENT)
Dept: AUDIOLOGY | Facility: CLINIC | Age: 60
End: 2025-08-19
Payer: COMMERCIAL

## 2025-08-19 ENCOUNTER — APPOINTMENT (OUTPATIENT)
Dept: AUDIOLOGY | Facility: CLINIC | Age: 60
End: 2025-08-19

## 2025-08-27 ENCOUNTER — TELEPHONE (OUTPATIENT)
Dept: PRIMARY CARE | Facility: CLINIC | Age: 60
End: 2025-08-27
Payer: COMMERCIAL

## 2025-10-21 ENCOUNTER — APPOINTMENT (OUTPATIENT)
Dept: AUDIOLOGY | Facility: CLINIC | Age: 60
End: 2025-10-21
Payer: COMMERCIAL

## 2025-10-21 ENCOUNTER — APPOINTMENT (OUTPATIENT)
Dept: AUDIOLOGY | Facility: CLINIC | Age: 60
End: 2025-10-21

## 2025-12-11 ENCOUNTER — APPOINTMENT (OUTPATIENT)
Dept: PRIMARY CARE | Facility: CLINIC | Age: 60
End: 2025-12-11
Payer: COMMERCIAL

## 2026-04-13 ENCOUNTER — APPOINTMENT (OUTPATIENT)
Dept: DERMATOLOGY | Facility: CLINIC | Age: 61
End: 2026-04-13
Payer: COMMERCIAL